# Patient Record
Sex: FEMALE | Race: WHITE | Employment: OTHER | URBAN - METROPOLITAN AREA
[De-identification: names, ages, dates, MRNs, and addresses within clinical notes are randomized per-mention and may not be internally consistent; named-entity substitution may affect disease eponyms.]

---

## 2020-09-04 ENCOUNTER — HOSPITAL ENCOUNTER (OUTPATIENT)
Dept: MRI IMAGING | Facility: CLINIC | Age: 80
Discharge: HOME | End: 2020-09-04
Attending: NURSE PRACTITIONER | Admitting: NURSE PRACTITIONER
Payer: COMMERCIAL

## 2020-09-04 DIAGNOSIS — I67.1 CEREBRAL ANEURYSM: ICD-10-CM

## 2020-09-04 PROCEDURE — 70544 MR ANGIOGRAPHY HEAD W/O DYE: CPT

## 2020-10-29 ENCOUNTER — APPOINTMENT (OUTPATIENT)
Dept: CT IMAGING | Facility: CLINIC | Age: 80
End: 2020-10-29
Attending: EMERGENCY MEDICINE
Payer: COMMERCIAL

## 2020-10-29 ENCOUNTER — HOSPITAL ENCOUNTER (EMERGENCY)
Facility: CLINIC | Age: 80
Discharge: ANOTHER HOSPITAL | End: 2020-10-29
Attending: EMERGENCY MEDICINE | Admitting: EMERGENCY MEDICINE
Payer: COMMERCIAL

## 2020-10-29 VITALS
DIASTOLIC BLOOD PRESSURE: 78 MMHG | OXYGEN SATURATION: 93 % | TEMPERATURE: 96.7 F | BODY MASS INDEX: 23.17 KG/M2 | RESPIRATION RATE: 18 BRPM | SYSTOLIC BLOOD PRESSURE: 130 MMHG | HEART RATE: 108 BPM | WEIGHT: 135 LBS

## 2020-10-29 DIAGNOSIS — Z86.79 HISTORY OF CEREBRAL ANEURYSM: ICD-10-CM

## 2020-10-29 DIAGNOSIS — I61.9 INTRAPARENCHYMAL HEMORRHAGE OF BRAIN (H): ICD-10-CM

## 2020-10-29 LAB
ANION GAP SERPL CALCULATED.3IONS-SCNC: 9 MMOL/L (ref 3–14)
APTT PPP: 27 SEC (ref 22–37)
BASOPHILS # BLD AUTO: 0 10E9/L (ref 0–0.2)
BASOPHILS NFR BLD AUTO: 0.4 %
BUN SERPL-MCNC: 13 MG/DL (ref 7–30)
CALCIUM SERPL-MCNC: 8.8 MG/DL (ref 8.5–10.1)
CHLORIDE SERPL-SCNC: 101 MMOL/L (ref 94–109)
CO2 SERPL-SCNC: 27 MMOL/L (ref 20–32)
CREAT BLD-MCNC: 0.7 MG/DL (ref 0.52–1.04)
CREAT SERPL-MCNC: 0.59 MG/DL (ref 0.52–1.04)
DIFFERENTIAL METHOD BLD: ABNORMAL
EOSINOPHIL # BLD AUTO: 0.1 10E9/L (ref 0–0.7)
EOSINOPHIL NFR BLD AUTO: 1.7 %
ERYTHROCYTE [DISTWIDTH] IN BLOOD BY AUTOMATED COUNT: 13.8 % (ref 10–15)
GFR SERPL CREATININE-BSD FRML MDRD: 81 ML/MIN/{1.73_M2}
GFR SERPL CREATININE-BSD FRML MDRD: 87 ML/MIN/{1.73_M2}
GLUCOSE BLDC GLUCOMTR-MCNC: 169 MG/DL (ref 70–99)
GLUCOSE SERPL-MCNC: 162 MG/DL (ref 70–99)
HCT VFR BLD AUTO: 40 % (ref 35–47)
HGB BLD-MCNC: 12.1 G/DL (ref 11.7–15.7)
IMM GRANULOCYTES # BLD: 0.1 10E9/L (ref 0–0.4)
IMM GRANULOCYTES NFR BLD: 0.7 %
INR PPP: 0.94 (ref 0.86–1.14)
LYMPHOCYTES # BLD AUTO: 1 10E9/L (ref 0.8–5.3)
LYMPHOCYTES NFR BLD AUTO: 14.3 %
MCH RBC QN AUTO: 29.7 PG (ref 26.5–33)
MCHC RBC AUTO-ENTMCNC: 30.3 G/DL (ref 31.5–36.5)
MCV RBC AUTO: 98 FL (ref 78–100)
MONOCYTES # BLD AUTO: 0.5 10E9/L (ref 0–1.3)
MONOCYTES NFR BLD AUTO: 7.3 %
NEUTROPHILS # BLD AUTO: 5.5 10E9/L (ref 1.6–8.3)
NEUTROPHILS NFR BLD AUTO: 75.6 %
NRBC # BLD AUTO: 0 10*3/UL
NRBC BLD AUTO-RTO: 0 /100
PLATELET # BLD AUTO: 244 10E9/L (ref 150–450)
POTASSIUM SERPL-SCNC: 3.3 MMOL/L (ref 3.4–5.3)
RBC # BLD AUTO: 4.07 10E12/L (ref 3.8–5.2)
SODIUM SERPL-SCNC: 137 MMOL/L (ref 133–144)
TROPONIN I SERPL-MCNC: <0.015 UG/L (ref 0–0.04)
WBC # BLD AUTO: 7.3 10E9/L (ref 4–11)

## 2020-10-29 PROCEDURE — 96374 THER/PROPH/DIAG INJ IV PUSH: CPT | Mod: 59

## 2020-10-29 PROCEDURE — 70450 CT HEAD/BRAIN W/O DYE: CPT | Mod: XS

## 2020-10-29 PROCEDURE — 999N001017 HC STATISTIC GLUCOSE BY METER IP

## 2020-10-29 PROCEDURE — 93005 ELECTROCARDIOGRAM TRACING: CPT

## 2020-10-29 PROCEDURE — 250N000011 HC RX IP 250 OP 636: Performed by: EMERGENCY MEDICINE

## 2020-10-29 PROCEDURE — 99285 EMERGENCY DEPT VISIT HI MDM: CPT | Mod: 25

## 2020-10-29 PROCEDURE — 80048 BASIC METABOLIC PNL TOTAL CA: CPT | Performed by: EMERGENCY MEDICINE

## 2020-10-29 PROCEDURE — 84484 ASSAY OF TROPONIN QUANT: CPT | Performed by: EMERGENCY MEDICINE

## 2020-10-29 PROCEDURE — 96375 TX/PRO/DX INJ NEW DRUG ADDON: CPT

## 2020-10-29 PROCEDURE — 70496 CT ANGIOGRAPHY HEAD: CPT

## 2020-10-29 PROCEDURE — 250N000009 HC RX 250: Performed by: EMERGENCY MEDICINE

## 2020-10-29 PROCEDURE — 85025 COMPLETE CBC W/AUTO DIFF WBC: CPT | Performed by: EMERGENCY MEDICINE

## 2020-10-29 PROCEDURE — 82565 ASSAY OF CREATININE: CPT

## 2020-10-29 PROCEDURE — 85730 THROMBOPLASTIN TIME PARTIAL: CPT | Performed by: EMERGENCY MEDICINE

## 2020-10-29 PROCEDURE — 85610 PROTHROMBIN TIME: CPT | Performed by: EMERGENCY MEDICINE

## 2020-10-29 RX ORDER — IOPAMIDOL 755 MG/ML
500 INJECTION, SOLUTION INTRAVASCULAR ONCE
Status: COMPLETED | OUTPATIENT
Start: 2020-10-29 | End: 2020-10-29

## 2020-10-29 RX ORDER — LEVETIRACETAM 15 MG/ML
1500 INJECTION INTRAVASCULAR ONCE
Status: COMPLETED | OUTPATIENT
Start: 2020-10-29 | End: 2020-10-29

## 2020-10-29 RX ADMIN — LEVETIRACETAM 1500 MG: 15 INJECTION INTRAVENOUS at 19:22

## 2020-10-29 RX ADMIN — IOPAMIDOL 70 ML: 755 INJECTION, SOLUTION INTRAVENOUS at 18:48

## 2020-10-29 RX ADMIN — NICARDIPINE HYDROCHLORIDE 2.5 MG/HR: 0.2 INJECTION, SOLUTION INTRAVENOUS at 19:24

## 2020-10-29 ASSESSMENT — ENCOUNTER SYMPTOMS
SPEECH DIFFICULTY: 1
CONFUSION: 1
FACIAL ASYMMETRY: 0
DECREASED CONCENTRATION: 1

## 2020-10-29 NOTE — ED PROVIDER NOTES
History     Chief Complaint:  Altered Mental Status       HPI  Herta L Schoenborn is a 80 year old female with a history of hypertension, high cholesterol, cerebral aneurysm, and subarachnoid hemorrhage who presents for evaluation of altered mental status. Per the patients daughter, she states that the patient has been minimally verbal and unable to follow commands. Her symptoms started at dinner 45 minutes prior to arrival to the emergency department. The family reports that she responded en route here with a smile. Her family denies any facial dropping. Her family also denies any gait problems when getting into the car. Per the patient's  she had a fall two days ago, initially landing on her knees then lightly hitting her head, but was otherwise not appearing injured and has been normal since. The patient takes 81 mg of aspirin.     Pertinent information from 1/2012 hospitalization:  #1. Subarachnoid Hemorrhage   --Vela grade III, Collazo Márquez III subarachnoid hemorrhage secondary to ruptured aneurysm.   --Cerebral angiogram - Left PcomA aneurysm, successfully coiled on 1/3/12 and stable on repeat angiogram 01/10.  --CT head 01/13 - decreased hemorrhage, no edema, no hydrocephalus, EVD in place.   #2. Obstructive hydrocephalus   --EVD placed on 1/3/12. Clamped from 01/14 to 01/16 with no elevated on ICP. EVD removed 01/16.   #3. Subacute strokes  --MRI brain 1/9/12 - multiple small infarcts (10), in the terminal penetrating branches, possibly from prolonged hypotension. May contribute to some encephalopathy, but should not worsen prognosis. No significant deficits.  --Cerebral angiogram 01/10 ruled out vasospasm or occlusion.     Allergies:  Bee Venom      Medications:   Lopressor   Protonix   Simvastatin      Medical History:   Anxiety   Gastrointestinal bleed   High blood cholesterol level   High blood pressure   High cholesterol  Subarachnoid hemorrhage   Respiratory failure   Pneumonitis due to  inhalation of food or vomitus      Surgical History   Abdomen surgery   Craniotomy   Esophagoscopy, gastroscopy, duodenoscopy  Hysterectomy   Laparoscopy assisted insertion tube gastrostomy  Tracheostomy     Family History:   Mother: diabetes  Father: emphysema   Sister: diabetes  Daughter: heart disease  Brother: neurologic disorder    Social History:  The patient was accompanied to the ED by family.  Smoking Status: Never Smoker  Smokeless Tobacco: Never Used  Alcohol Use: Negative  Drug Use: Negative  PCP: Abilio Brock      Review of Systems   Unable to perform ROS: Mental status change   Musculoskeletal: Negative for gait problem.   Neurological: Positive for speech difficulty. Negative for facial asymmetry.   Psychiatric/Behavioral: Positive for confusion and decreased concentration.   All other systems reviewed and are negative.      Physical Exam     Patient Vitals for the past 24 hrs:   BP Temp Temp src Pulse Resp SpO2 Weight   10/29/20 1945 (!) 158/84 -- -- 101 -- 95 % --   10/29/20 1940 (!) 160/78 -- -- 105 -- 96 % --   10/29/20 1935 (!) 170/71 -- -- 106 -- 95 % --   10/29/20 1930 (!) 166/86 -- -- 96 -- 96 % --   10/29/20 1920 (!) 179/88 -- -- 97 -- -- --   10/29/20 1910 (!) 169/83 -- -- 105 -- 97 % --   10/29/20 1905 (!) 166/76 -- -- 106 -- 95 % --   10/29/20 1900 (!) 160/81 -- -- 102 -- -- --   10/29/20 1854 -- 96.7  F (35.9  C) Temporal -- -- -- 61.2 kg (135 lb)   10/29/20 1840 (!) 183/86 -- -- 78 18 98 % --        Physical Exam  Constitutional: Alert, attentive, GCS [id number] (13)  HENT: left forehead contusion, slightly raised   Nose: Nose normal.    Mouth/Throat: Oropharynx is clear, mucous membranes are moist   Eyes: EOM are normal.   CV: regular rate and rhythm; no murmurs, rubs or gallups  Chest: Effort normal and breath sounds normal.   GI:  There is no tenderness. No distension. Normal bowel sounds  MSK: Normal range of motion.   Neurological: Alert, attentive, GCS 13  Moves all extremities  "spontaneously with normal tone, possible right upper extremity weakness but does not follow commands  Withdraws to pain in all extremities  Occasional inappropriate word use (\"what, no\") but not answering questions  Skin: Skin is warm and dry.        Emergency Department Course   Imaging:  Radiology results were communicated with the patient and family who voiced understanding of the findings.    CT Head w/o Contrast  1. Moderate to large ruptured left frontal lobe acute intraparenchymal hemorrhage with associated subarachnoid extension of hemorrhage. There is regional mass effect resulting in 2 mm rightward midline shift. No herniation or hydrocephalus.  2. Progression of moderate to severe chronic small vessel ischemic disease and mild to moderate generalized brain parenchymal volume loss since 01/17/2013.  3. Small chronic cortical infarcts in the parasagittal right frontal lobe and right parietal lobe.  4. Postprocedural changes of left internal carotid artery communicating segment region aneurysm coil embolization.    Findings discussed with Dr. Suarez by myself at 6:55 PM on 10/29/2020.    CTA Head Neck with Contrast   HEAD CTA:   1.  No underlying vascular anomaly to explain patient's left frontal intraparenchymal hemorrhage. No evidence of active contrast intravasation.  2.  Coiled left internal carotid artery communicating segment saccular aneurysm. No evidence of residual/recurrent aneurysm neck.  3.  Widely patent major intracranial arteries. No significant stenosis.  4.  Odontogenic right ostiomeatal unit pattern sinusitis secondary to multiple maxillary periapical abscesses.    NECK CTA:  1.  Widely patent major cervical arteries. No stenosis or evidence of dissection.  2.  Fibromuscular dysplasia with involvement of the bilateral distal cervical internal carotid arteries.  Reading per radiology     Laboratory:  Laboratory findings were communicated with the patient and family who voiced " understanding of the findings.    CBC: WBC 7.3, HGB 12.1,   BMP: potassium 3.3 (L), glucose 162 (H) (Creatinine 0.59) o/w WNL  Creatinine POCT: creatinine 0.7, GFR estimate 81  INR: 0.94   Troponin (Collected 1841): <0.015   Glucose by meter: 169 (G)   Partial thromboplastin time: 27     Interventions:   1922 keppra 1500 mg IV  1924 Cardene 2.5 mg/hr IV  1951 Cardene 5 mg/hr IV    Emergency Department Course:    1835 Nursing notes and vitals reviewed. I performed an exam of the patient as documented above.     1838 Code stroke was called.     1841 IV was inserted and blood was drawn for laboratory testing, results above.     1850 The patient was sent for a CT while in the emergency department, results above.    1850 The patient was sent for a CTA while in the emergency department, results above.         1855 I spoke with Dr. Dr. Mcadams of the radiologist service from [facility] regarding patient's presentation, findings, and plan of care.     1935 I consulted with Dr. Moralez of the neurology services regarding patient's presentation, findings, and plan of care.    1955 I spoke with Dr. Velez of the emergency departmnent service and Dr. Duran of the trauma service regarding patient's presentation, findings, and plan of care.     I consulted with [facility] ED & Trauma Service. They are in agreement to accept transfer for the patient. Findings and plan explained to the spouse and daughter. Patient will be transferred to [facility] emergently via EMS.    Impression & Plan     Medical Decision Making:    This is a 80-year-old female with history of cerebral aneurysm and SAH status post coiling in 2012, not anticoagulated, who presents for evaluation of acute altered mental status.  Exam is consistent with significant aphasia, both expressive and receptive, as well as possible right upper extremity weakness.  She is protecting her airway but not following commands.  Emergent imaging shows a significant left  frontal intraparenchymal hemorrhage consistent with likely recent trauma, although family described a mild mechanism fall.  She will be transferred emergently to [facility] as there are no ICU beds available at [facility], [facility], or any other systems in the [facility].  On final recheck, she remains protecting her airway although this will need to be monitored closely given the above.  Nicardipine has improved her blood pressures to goal and she is received Keppra for seizure prophylaxis.    Critical Care time was 35 minutes for this patient excluding procedures.     Diagnosis:     ICD-10-CM   1. Intraparenchymal hemorrhage of brain (H)  I61.9   2. History of cerebral aneurysm  Z86.79        Disposition:  Transferred to [facility].      Scribe Disclosure:  Nguyen STORM, am serving as a scribe at 6:56 PM on 10/29/2020 to document services personally performed by Huang Suarez MD based on my observations and the provider's statements to me.         Huang Suarez MD  10/29/20 2020

## 2020-10-29 NOTE — ED TRIAGE NOTES
Pt was eating dinner with  around 1745 when she suddenly was not answering questions or following commands. Pt is moving all extremities. + aphasia. Bruise to left side of forehead from a fall last night. Not on any blood thinners.

## 2020-10-30 LAB — INTERPRETATION ECG - MUSE: NORMAL

## 2020-10-30 NOTE — CONSULTS
[facility]    Stroke Telephone Note    I was called by Dr. Huang Suarez on 10/29/20 at 1824 regarding patient Herta L Schoenborn. The patient is a 80 year old female with PMH of T2DM and a prior left P-comm aneurysmal rupture resulting in diffuse subarachnoid hemorrhage in January 2012 status post trach/PEG with annual MRA follow-up [most recent in September 2020 -stable post coiled aneurysm without any residual, with no additional visible aneurysms/vascular lesions] now seen as a stroke alert for aphasia.    The patient is reported to have been in the usual state of health when around 5:45 PM she was with her family when she suddenly developed word finding difficulty.  She was brought into the ED at [facility] as a stroke alert, on arrival -183 mmHg.  She had a head CT that showed a high left frontal 29.6 cc intraparenchymal hemorrhage without any intraventricular/subarachnoid components.  CTA of the head and neck and previous MRA imagings did not reveal any vascular malformations.    Stroke Code Data  (for stroke code without tele)  Stroke code activated 10/29/20   1824   First stroke provider response 10/29/20   1825   Last known normal 10/29/20   1745   Time of discovery   (or onset of symptoms) 10/29/20   1745   Head CT read by me 10/29/20   1650   Was stroke code de-escalated? Yes 10/29/20 1901  presence of contraindications for both intravenous and intra-arterial stroke treatments       TPA Treatment   Not given due to Active bleeding.    Endovascular Treatment  Not initiated due to absence of proximal vessel occlusion    Impression  Intracerebral Hemorrhage   Etiology: Unclear.  Do not see a underlying vascular malformation.  Given the patient's age, suspect cerebral amyloid angiopathy.  Initial -180 mmHg, however she is not reported to have a history of hypertension and does not appear to be on antihypertensive medications at home.  We will look for a  "potential underlying pathology on MRI brain with contrast.    No reported anticoagulant medication use.  Current INR 0.9, platelets 244.     Recommendations  Acute Hemorrhagic Stroke Recommendations  - Neurosurgery consultation  - Stability head CT at 6 hours, should be scheduled for 1 AM  - Neurochecks Q 1 hour  - Systolic BP Goal: < 140 mmhg  - Head of bed elevated to 30 degrees  - Telemetry, EKG  - Imaging: MRI brain with and without contrast  - Bedside Glucose Monitoring  - A1c, Troponin x 3  - PT/OT/SLP  - Stroke Education  - Euthermia, Euglycemia    My recommendations are based on my interpretation of patient's clinical presentation per discussion with the ED physician and personal review of neuroimaging completed in the ER.  I was not requested to personally see or examine the patient at this time.    The Stroke Staff is Dr. Neal.    MONIKA PAGE MD  Vascular Neurology Fellow  To page me or covering stroke neurology team member, click here: [facility]   Choose \"On Call\" tab at top, then search dropdown box for \"Neurology Adult\", select location, press Enter, then look for stroke/neuro ICU/telestroke.         "